# Patient Record
Sex: MALE | Race: BLACK OR AFRICAN AMERICAN
[De-identification: names, ages, dates, MRNs, and addresses within clinical notes are randomized per-mention and may not be internally consistent; named-entity substitution may affect disease eponyms.]

---

## 2018-12-26 ENCOUNTER — HOSPITAL ENCOUNTER (EMERGENCY)
Dept: HOSPITAL 61 - ER | Age: 53
Discharge: HOME | End: 2018-12-26
Payer: SELF-PAY

## 2018-12-26 VITALS — SYSTOLIC BLOOD PRESSURE: 163 MMHG | DIASTOLIC BLOOD PRESSURE: 78 MMHG

## 2018-12-26 VITALS — WEIGHT: 255 LBS | BODY MASS INDEX: 38.65 KG/M2 | HEIGHT: 68 IN

## 2018-12-26 DIAGNOSIS — J98.8: Primary | ICD-10-CM

## 2018-12-26 LAB
INFLUENZA A PATIENT: NEGATIVE
INFLUENZA B PATIENT: NEGATIVE

## 2018-12-26 PROCEDURE — 99284 EMERGENCY DEPT VISIT MOD MDM: CPT

## 2018-12-26 PROCEDURE — 87804 INFLUENZA ASSAY W/OPTIC: CPT

## 2018-12-26 PROCEDURE — 71046 X-RAY EXAM CHEST 2 VIEWS: CPT

## 2018-12-26 NOTE — PHYS DOC
Adult General


Chief Complaint


Chief Complaint:  FLU SYMPTOM





HPI


HPI





Patient is a 53  year old male who presents with 2 weeks of fever, body aches, 

cough is nonproductive bilateral rib pain from coughing and runny nose.





Review of Systems


Review of Systems





Constitutional: Denies fever or chills []


Eyes: Denies change in visual acuity, redness, or eye pain []


HENT:  nasal congestion. Denies sore throat []


Respiratory:  cough. Denies shortness of breath []


Cardiovascular: No additional information not addressed in HPI []


GI: Denies abdominal pain, nausea, vomiting, bloody stools or diarrhea []


: Denies dysuria or hematuria []


Musculoskeletal: Denies back pain or joint pain []


Integument: Denies rash or skin lesions []


Neurologic: Denies headache, focal weakness or sensory changes []








All other systems were reviewed and found to be within normal limits, except as 

documented in this note.





Physical Exam


Physical Exam





Constitutional: Well developed, well nourished, no acute distress, non-toxic 

appearance. []


HENT: Normocephalic, atraumatic, bilateral external ears normal, oropharynx 

moist, no oral exudates, nose normal. Throat reddened without exudates.[]


Eyes: PERRLA, EOMI, conjunctiva normal, no discharge. [] 


Neck: Normal range of motion, no tenderness, supple, no stridor. [] 


Cardiovascular:Heart rate regular rhythm, no murmur []


Lungs & Thorax:  Bilateral upper lobes are clear but lower lobes are diminished.


Abdomen: Bowel sounds normal, soft, no tenderness, no masses, no pulsatile 

masses. [] 


Skin: Warm, dry, no erythema, no rash. [] 


Back: No tenderness, no CVA tenderness. [] 


Extremities: No tenderness, no cyanosis, no clubbing, ROM intact, no edema. [] 


Neurologic: Alert and oriented X 3, normal motor function, normal sensory 

function, no focal deficits noted. []


Psychologic: Affect normal, judgement normal, mood normal. []





Current Patient Data


Vital Signs





 Vital Signs








  Date Time  Temp Pulse Resp B/P (MAP) Pulse Ox O2 Delivery O2 Flow Rate FiO2


 


12/26/18 19:42 99.0 77 20 163/78 (106) 99 Room Air  





 99.0       








Lab Values





 Laboratory Tests








Test


 12/26/18


19:50


 


Influenza Type A Antigen


 Negative


(NEGATIVE)


 


Influenza Type B Antigen


 Negative


(NEGATIVE)











EKG


EKG


[]





Radiology/Procedures


Radiology/Procedures


Chest x-ray





Course & Med Decision Making


Course & Med Decision Making


Patient is a 53  year old male who presents with 2 weeks of fever, body aches, 

cough is nonproductive bilateral rib pain from coughing and runny nose. Lungs 

are clear bilaterally in upper lobes but lower lobes are diminished. States he 

is not coughing up any mucus. Alert and oriented. Skin pink warm and dry. He is 

eating and drinking appropriately. He denies abdominal pain, nausea, vomiting, 

dizziness, head pain. Walks with steady gait. Speaks in full clear sentences. 

Abdomen is soft and non tender. Throat is reddened and with no exudates. Denies 

throat pain or ear pain. Patient has no extremity edema and denies chest pain 

or soa. Temperature in ED is 99 0, 97% on room air, 77 heart rate. 





Preliminary xray shows possible pneumonia and was read by Dr Medina. Patient 

will be treat with a z pack and cough medications. Patient to follow up with 

primary care.





Dragon Disclaimer


Dragon Disclaimer


This electronic medical record was generated, in whole or in part, using a 

voice recognition dictation system.





Departure


Departure


Impression:  


 Primary Impression:  


 Respiratory infection


Disposition:  01 HOME, SELF-CARE


Condition:  STABLE


Referrals:  


ANTONIO HORN MD (PCP)


Patient Instructions:  Pneumonia, Adult





Additional Instructions:  


Follow-up with primary care doctor. Take medications as prescribed. Drink 

plenty of fluids.


Scripts


Benzonatate (TESSALON PERLE) 100 Mg Capsule


1 CAP PO TID, #30 CAP


   Prov: VALENTINO SCHROEDER APRN         12/26/18 


Azithromycin (AZITHROMYCIN TABLET) 250 Mg Tablet


1 PKG PO UD, #6 TAB


   Prov: VALENTINO SCHROEDER APRN         12/26/18











VALENTINO SCHROEDER APRN Dec 26, 2018 19:49

## 2018-12-26 NOTE — RAD
INDICATION: ER PATIENT. COUGH, FEVER. X3 WEEKS. Hx ASTHMA, PE, DVT. NO 

PRIORS

 

COMPARISON: None

 

FINDINGS:

 

 

2 views of chest obtained.

Mild interstitial prominence with questionable haziness at the 

retrocardiac region.

Degenerative changes the spine with osteophyte formation.

Cardiac mediastinal silhouette prominent in size.

 

IMPRESSION:

 

1.  Mild haziness at the retrocardiac region. This could be secondary to a

region of atelectasis however if there is high concern for infection early

infiltrate can have this appearance.

 

Electronically signed by: Fabien Gan MD (12/26/2018 11:37 PM) [facility]